# Patient Record
Sex: MALE | Race: WHITE | NOT HISPANIC OR LATINO | Employment: OTHER | URBAN - NONMETROPOLITAN AREA
[De-identification: names, ages, dates, MRNs, and addresses within clinical notes are randomized per-mention and may not be internally consistent; named-entity substitution may affect disease eponyms.]

---

## 2019-05-29 ENCOUNTER — OFFICE VISIT (OUTPATIENT)
Dept: CARDIOLOGY | Facility: CLINIC | Age: 59
End: 2019-05-29

## 2019-05-29 VITALS
DIASTOLIC BLOOD PRESSURE: 84 MMHG | HEIGHT: 69 IN | OXYGEN SATURATION: 98 % | WEIGHT: 181 LBS | HEART RATE: 82 BPM | SYSTOLIC BLOOD PRESSURE: 144 MMHG | BODY MASS INDEX: 26.81 KG/M2

## 2019-05-29 DIAGNOSIS — I10 ESSENTIAL HYPERTENSION: ICD-10-CM

## 2019-05-29 DIAGNOSIS — E78.2 MIXED HYPERLIPIDEMIA: ICD-10-CM

## 2019-05-29 DIAGNOSIS — I45.10 INCOMPLETE RIGHT BUNDLE BRANCH BLOCK: ICD-10-CM

## 2019-05-29 DIAGNOSIS — R07.9 CHEST PAIN IN ADULT: Primary | ICD-10-CM

## 2019-05-29 PROCEDURE — 99204 OFFICE O/P NEW MOD 45 MIN: CPT | Performed by: INTERNAL MEDICINE

## 2019-05-29 PROCEDURE — 93000 ELECTROCARDIOGRAM COMPLETE: CPT | Performed by: INTERNAL MEDICINE

## 2019-05-29 RX ORDER — AMLODIPINE BESYLATE 5 MG/1
5 TABLET ORAL DAILY
Qty: 30 TABLET | Refills: 0 | Status: SHIPPED | OUTPATIENT
Start: 2019-05-29 | End: 2019-06-29 | Stop reason: SDUPTHER

## 2019-05-29 RX ORDER — LISINOPRIL AND HYDROCHLOROTHIAZIDE 20; 12.5 MG/1; MG/1
TABLET ORAL DAILY
COMMUNITY
Start: 2019-05-09

## 2019-05-29 RX ORDER — ATORVASTATIN CALCIUM 20 MG/1
20 TABLET, FILM COATED ORAL DAILY
Qty: 30 TABLET | Refills: 0 | Status: SHIPPED | OUTPATIENT
Start: 2019-05-29 | End: 2019-06-29 | Stop reason: SDUPTHER

## 2019-05-29 NOTE — PROGRESS NOTES
Subjective   Chief Complaint   Patient presents with   • Abnormal ECG      Blockage per PCP patient states       History of Present Illness  Patient is 58 years old white male who is here for cardiac evaluation because of abnormal EKG and hypertension.  Patient states that he was taking lisinopril HCT 20/12.5 daily along with some beta-blocker which had to be stopped for slow heart rate.  His blood pressure has been running mildly elevated.  He complained of mild chest discomfort.  An EKG apparently showed incomplete right bundle branch block patient also has significant hyperlipidemia and is here for cardiac evaluation.    Chest pain is mild around 4-5 out of 10 in the midsternal area.  No radiation of the pain no accompanying symptoms such as nausea or vomiting.  Pain is not related to exertion.  Blood pressure is a chronic problem and has been difficult to control.  Patient has not taken any medications for hyperlipidemia but he is trying to diet.  He is non-smoker.    Past Surgical History:   Procedure Laterality Date   • ARM WOUND REPAIR / CLOSURE       Family History   Problem Relation Age of Onset   • Diabetes Mother    • Heart failure Mother    • Colon cancer Father      Past Medical History:   Diagnosis Date   • Hypertension        Patient Active Problem List   Diagnosis   • Chest pain in adult   • Essential hypertension   • Mixed hyperlipidemia   • Incomplete right bundle branch block         Social History     Tobacco Use   • Smoking status: Never Smoker   • Smokeless tobacco: Never Used   Substance Use Topics   • Alcohol use: No     Frequency: Never   • Drug use: No         The following portions of the patient's history were reviewed and updated as appropriate: allergies, current medications, past family history, past medical history, past social history, past surgical history and problem list.    No Known Allergies      Current Outpatient Medications:   •  lisinopril-hydrochlorothiazide  "(PRINZIDE,ZESTORETIC) 20-12.5 MG per tablet, Daily., Disp: , Rfl:   •  amLODIPine (NORVASC) 5 MG tablet, Take 1 tablet by mouth Daily., Disp: 30 tablet, Rfl: 0  •  atorvastatin (LIPITOR) 20 MG tablet, Take 1 tablet by mouth Daily., Disp: 30 tablet, Rfl: 0    Review of Systems   Constitution: Negative.   HENT: Negative.  Negative for congestion.    Eyes: Negative.    Cardiovascular: Positive for chest pain and dyspnea on exertion. Negative for cyanosis, irregular heartbeat, leg swelling, near-syncope, orthopnea, palpitations, paroxysmal nocturnal dyspnea and syncope.   Respiratory: Positive for shortness of breath.    Endocrine: Negative.    Hematologic/Lymphatic: Negative.    Skin: Negative.    Musculoskeletal: Negative.    Gastrointestinal: Negative.    Genitourinary: Negative.    Neurological: Negative.  Negative for headaches.   Psychiatric/Behavioral: Negative.         Objective      /84   Pulse 82   Ht 175.3 cm (69\")   Wt 82.1 kg (181 lb)   SpO2 98%   BMI 26.73 kg/m²     Physical Exam   Constitutional: He appears well-developed and well-nourished. No distress.   HENT:   Head: Normocephalic and atraumatic.   Mouth/Throat: Oropharynx is clear and moist. No oropharyngeal exudate.   Eyes: Conjunctivae and EOM are normal. Pupils are equal, round, and reactive to light. No scleral icterus.   Neck: Normal range of motion. Neck supple. No JVD present. No tracheal deviation present. No thyromegaly present.   Cardiovascular: Normal rate, regular rhythm, normal heart sounds and intact distal pulses. PMI is not displaced. Exam reveals no gallop, no friction rub and no decreased pulses.   No murmur heard.  Pulses:       Carotid pulses are 3+ on the right side, and 3+ on the left side.       Radial pulses are 3+ on the right side, and 3+ on the left side.   Pulmonary/Chest: Effort normal and breath sounds normal. No respiratory distress. He has no wheezes. He has no rales. He exhibits no tenderness.   Abdominal: " Soft. Bowel sounds are normal. He exhibits no distension, no abdominal bruit and no mass. There is no splenomegaly or hepatomegaly. There is no tenderness. There is no rebound and no guarding.   Musculoskeletal: Normal range of motion. He exhibits no edema, tenderness or deformity.   Lymphadenopathy:     He has no cervical adenopathy.   Neurological: He is alert. He has normal reflexes. No cranial nerve deficit. He exhibits normal muscle tone. Coordination normal.   Skin: Skin is warm and dry. No rash noted. He is not diaphoretic. No erythema.   Psychiatric: He has a normal mood and affect. His behavior is normal. Judgment and thought content normal.       Lab Review:          ECG 12 Lead  Date/Time: 5/29/2019 3:50 PM  Performed by: Cristhian Ruiz MD  Authorized by: Cristhian Ruiz MD   Previous ECG: no previous ECG available  Rhythm: sinus rhythm  Rate: normal  BPM: 62  Conduction: incomplete right bundle branch block  ST Segments: ST segments normal  T Waves: T waves normal  QRS axis: normal  Other: no other findings    Clinical impression: non-specific ECG            I reviewed the patient's new clinical results.  I personally viewed and interpreted the patient's EKG/lab data        Assessment:   Diagnosis Plan   1. Chest pain in adult  Adult Transthoracic Echo Complete W/ Cont if Necessary Per Protocol    Stress Test With Myocardial Perfusion One Day    ECG 12 Lead   2. Mixed hyperlipidemia  Comprehensive Metabolic Panel    CK    Lipid Panel   3. Essential hypertension     4. Incomplete right bundle branch block            Plan:  Patient complains of chest pain with typical and atypical features.  It is probably noncardiac however further cardiac work-up is indicated.  Echo and stress test was scheduled.    Blood pressure is uncontrolled  Patient was advised to continue Zestoretic 20/12.5 daily.  Amlodipine 5 mg daily was added.  Patient also has significant hyperlipidemia  He was advised to  take Lipitor 20 mg daily.  Lab work will need to be checked in 1 month.  Patient will have his lab work checked with his PCP.    Healthy lifestyle and aggressive risk factor modification emphasized.  Follow-up after the stress test.    Thank you for giving me the oppertunity to participate in your patient's cardiac care.    Sincerely,    ADARSH Ruiz M.D. FACP [facility]     No Follow-up on file.

## 2019-06-06 ENCOUNTER — HOSPITAL ENCOUNTER (OUTPATIENT)
Dept: NUCLEAR MEDICINE | Facility: HOSPITAL | Age: 59
Discharge: HOME | End: 2019-06-06

## 2019-06-06 ENCOUNTER — HOSPITAL ENCOUNTER (OUTPATIENT)
Dept: CARDIOLOGY | Facility: HOSPITAL | Age: 59
Discharge: HOME | End: 2019-06-06

## 2019-06-06 DIAGNOSIS — R07.9 CHEST PAIN IN ADULT: ICD-10-CM

## 2019-06-06 PROCEDURE — 78452 HT MUSCLE IMAGE SPECT MULT: CPT | Performed by: INTERNAL MEDICINE

## 2019-06-06 PROCEDURE — 25010000002 REGADENOSON 0.4 MG/5ML SOLUTION: Performed by: INTERNAL MEDICINE

## 2019-06-06 PROCEDURE — 93018 CV STRESS TEST I&R ONLY: CPT | Performed by: INTERNAL MEDICINE

## 2019-06-06 PROCEDURE — 78452 HT MUSCLE IMAGE SPECT MULT: CPT

## 2019-06-06 PROCEDURE — 0 TECHNETIUM SESTAMIBI: Performed by: INTERNAL MEDICINE

## 2019-06-06 PROCEDURE — 93306 TTE W/DOPPLER COMPLETE: CPT

## 2019-06-06 PROCEDURE — 93306 TTE W/DOPPLER COMPLETE: CPT | Performed by: INTERNAL MEDICINE

## 2019-06-06 PROCEDURE — A9500 TC99M SESTAMIBI: HCPCS | Performed by: INTERNAL MEDICINE

## 2019-06-06 PROCEDURE — 93017 CV STRESS TEST TRACING ONLY: CPT

## 2019-06-06 RX ADMIN — TECHNETIUM TC 99M SESTAMIBI 1 DOSE: 1 INJECTION INTRAVENOUS at 10:03

## 2019-06-06 RX ADMIN — REGADENOSON 0.4 MG: 0.08 INJECTION, SOLUTION INTRAVENOUS at 10:03

## 2019-06-06 RX ADMIN — TECHNETIUM TC 99M SESTAMIBI 1 DOSE: 1 INJECTION INTRAVENOUS at 08:25

## 2019-06-07 ENCOUNTER — TELEPHONE (OUTPATIENT)
Dept: CARDIOLOGY | Facility: CLINIC | Age: 59
End: 2019-06-07

## 2019-06-07 LAB
BH CV ECHO MEAS - % IVS THICK: 14.2 %
BH CV ECHO MEAS - % LVPW THICK: -4.9 %
BH CV ECHO MEAS - ACS: 2 CM
BH CV ECHO MEAS - AO MAX PG: 12.8 MMHG
BH CV ECHO MEAS - AO MEAN PG: 5 MMHG
BH CV ECHO MEAS - AO ROOT AREA (BSA CORRECTED): 1.7
BH CV ECHO MEAS - AO ROOT AREA: 8.8 CM^2
BH CV ECHO MEAS - AO ROOT DIAM: 3.4 CM
BH CV ECHO MEAS - AO V2 MAX: 179 CM/SEC
BH CV ECHO MEAS - AO V2 MEAN: 99.3 CM/SEC
BH CV ECHO MEAS - AO V2 VTI: 41.9 CM
BH CV ECHO MEAS - BSA(HAYCOCK): 2 M^2
BH CV ECHO MEAS - BSA: 2 M^2
BH CV ECHO MEAS - BZI_BMI: 26.7 KILOGRAMS/M^2
BH CV ECHO MEAS - BZI_METRIC_HEIGHT: 175.3 CM
BH CV ECHO MEAS - BZI_METRIC_WEIGHT: 82.1 KG
BH CV ECHO MEAS - EDV(CUBED): 137 ML
BH CV ECHO MEAS - EDV(MOD-SP4): 76 ML
BH CV ECHO MEAS - EDV(TEICH): 126.9 ML
BH CV ECHO MEAS - EF(CUBED): 75.2 %
BH CV ECHO MEAS - EF(MOD-SP4): 67.1 %
BH CV ECHO MEAS - EF(TEICH): 66.7 %
BH CV ECHO MEAS - ESV(CUBED): 34 ML
BH CV ECHO MEAS - ESV(MOD-SP4): 25 ML
BH CV ECHO MEAS - ESV(TEICH): 42.2 ML
BH CV ECHO MEAS - FS: 37.1 %
BH CV ECHO MEAS - IVS/LVPW: 0.81
BH CV ECHO MEAS - IVSD: 1.1 CM
BH CV ECHO MEAS - IVSS: 1.2 CM
BH CV ECHO MEAS - LA DIMENSION: 3.5 CM
BH CV ECHO MEAS - LA/AO: 1
BH CV ECHO MEAS - LV DIASTOLIC VOL/BSA (35-75): 38.4 ML/M^2
BH CV ECHO MEAS - LV MASS(C)D: 241.9 GRAMS
BH CV ECHO MEAS - LV MASS(C)DI: 122.2 GRAMS/M^2
BH CV ECHO MEAS - LV MASS(C)S: 126.4 GRAMS
BH CV ECHO MEAS - LV MASS(C)SI: 63.8 GRAMS/M^2
BH CV ECHO MEAS - LV SYSTOLIC VOL/BSA (12-30): 12.6 ML/M^2
BH CV ECHO MEAS - LVIDD: 5.2 CM
BH CV ECHO MEAS - LVIDS: 3.2 CM
BH CV ECHO MEAS - LVLD AP4: 6.8 CM
BH CV ECHO MEAS - LVLS AP4: 5.9 CM
BH CV ECHO MEAS - LVOT AREA (M): 3.5 CM^2
BH CV ECHO MEAS - LVOT AREA: 3.5 CM^2
BH CV ECHO MEAS - LVOT DIAM: 2.1 CM
BH CV ECHO MEAS - LVPWD: 1.3 CM
BH CV ECHO MEAS - LVPWS: 1.3 CM
BH CV ECHO MEAS - MV A MAX VEL: 65.2 CM/SEC
BH CV ECHO MEAS - MV E MAX VEL: 83.9 CM/SEC
BH CV ECHO MEAS - MV E/A: 1.3
BH CV ECHO MEAS - PA ACC SLOPE: 1700 CM/SEC^2
BH CV ECHO MEAS - PA ACC TIME: 0.08 SEC
BH CV ECHO MEAS - PA PR(ACCEL): 44.4 MMHG
BH CV ECHO MEAS - RAP SYSTOLE: 10 MMHG
BH CV ECHO MEAS - RVSP: 44.1 MMHG
BH CV ECHO MEAS - SI(AO): 186.5 ML/M^2
BH CV ECHO MEAS - SI(CUBED): 52 ML/M^2
BH CV ECHO MEAS - SI(MOD-SP4): 25.8 ML/M^2
BH CV ECHO MEAS - SI(TEICH): 42.8 ML/M^2
BH CV ECHO MEAS - SV(AO): 369.3 ML
BH CV ECHO MEAS - SV(CUBED): 103 ML
BH CV ECHO MEAS - SV(MOD-SP4): 51 ML
BH CV ECHO MEAS - SV(TEICH): 84.7 ML
BH CV ECHO MEAS - TR MAX VEL: 292 CM/SEC
BH CV NUCLEAR PRIOR STUDY: 3
BH CV STRESS BP STAGE 1: NORMAL
BH CV STRESS BP STAGE 2: NORMAL
BH CV STRESS COMMENTS STAGE 1: NORMAL
BH CV STRESS COMMENTS STAGE 2: NORMAL
BH CV STRESS DOSE REGADENOSON STAGE 1: 0.4
BH CV STRESS DURATION MIN STAGE 1: 0
BH CV STRESS DURATION MIN STAGE 2: 4
BH CV STRESS DURATION SEC STAGE 1: 10
BH CV STRESS DURATION SEC STAGE 2: 0
BH CV STRESS HR STAGE 1: 89
BH CV STRESS HR STAGE 2: 61
BH CV STRESS PROTOCOL 1: NORMAL
BH CV STRESS RECOVERY BP: NORMAL MMHG
BH CV STRESS RECOVERY HR: 61 BPM
BH CV STRESS STAGE 1: 1
BH CV STRESS STAGE 2: 2
LV EF NUC BP: 76 %
MAXIMAL PREDICTED HEART RATE: 161 BPM
PERCENT MAX PREDICTED HR: 55.28 %
STRESS BASELINE BP: NORMAL MMHG
STRESS BASELINE HR: 54 BPM
STRESS PERCENT HR: 65 %
STRESS POST PEAK BP: NORMAL MMHG
STRESS POST PEAK HR: 89 BPM
STRESS TARGET HR: 137 BPM

## 2019-06-07 NOTE — TELEPHONE ENCOUNTER
----- Message from Cristhian Ruiz MD sent at 6/7/2019 10:56 AM EDT -----  Echo and stress tests are okay.  There does not appear to be any blockages.  Keep your appointment will discuss further

## 2019-06-26 ENCOUNTER — TELEPHONE (OUTPATIENT)
Dept: CARDIOLOGY | Facility: CLINIC | Age: 59
End: 2019-06-26

## 2019-06-26 ENCOUNTER — OFFICE VISIT (OUTPATIENT)
Dept: CARDIOLOGY | Facility: CLINIC | Age: 59
End: 2019-06-26

## 2019-06-26 VITALS
HEIGHT: 69 IN | OXYGEN SATURATION: 97 % | SYSTOLIC BLOOD PRESSURE: 140 MMHG | WEIGHT: 182 LBS | HEART RATE: 69 BPM | BODY MASS INDEX: 26.96 KG/M2 | DIASTOLIC BLOOD PRESSURE: 76 MMHG

## 2019-06-26 DIAGNOSIS — I10 ESSENTIAL HYPERTENSION: ICD-10-CM

## 2019-06-26 DIAGNOSIS — I45.10 INCOMPLETE RIGHT BUNDLE BRANCH BLOCK: ICD-10-CM

## 2019-06-26 DIAGNOSIS — E78.2 MIXED HYPERLIPIDEMIA: Primary | ICD-10-CM

## 2019-06-26 DIAGNOSIS — R07.89 ATYPICAL CHEST PAIN: ICD-10-CM

## 2019-06-26 PROCEDURE — 99213 OFFICE O/P EST LOW 20 MIN: CPT | Performed by: INTERNAL MEDICINE

## 2019-06-26 NOTE — TELEPHONE ENCOUNTER
----- Message from Cristhian Ruiz MD sent at 6/26/2019  2:10 PM EDT -----  Thyroid level is low.  Check with your PCP   may need thyroid medications  Cholesterol is okay

## 2019-06-26 NOTE — PROGRESS NOTES
subjective     Chief Complaint   Patient presents with   • Results     Stress and Echo     History of Present Illness  Patient is 58 years old white male who was seen by me because of chest pain.  Patient has multiple risk factors for coronary disease.  Blood pressure is very well controlled with Norvasc and lisinopril HCT  He is taking Lipitor for hyperlipidemia.  Patient underwent a stress test and echocardiogram is here for follow-up.  He has been doing very well at this time    Past Surgical History:   Procedure Laterality Date   • ARM WOUND REPAIR / CLOSURE       Family History   Problem Relation Age of Onset   • Diabetes Mother    • Heart failure Mother    • Colon cancer Father      Past Medical History:   Diagnosis Date   • Hypertension      Patient Active Problem List   Diagnosis   • Atypical chest pain   • Essential hypertension   • Mixed hyperlipidemia   • Incomplete right bundle branch block       Social History     Tobacco Use   • Smoking status: Never Smoker   • Smokeless tobacco: Never Used   Substance Use Topics   • Alcohol use: No     Frequency: Never   • Drug use: No       No Known Allergies    Current Outpatient Medications on File Prior to Visit   Medication Sig   • amLODIPine (NORVASC) 5 MG tablet Take 1 tablet by mouth Daily.   • atorvastatin (LIPITOR) 20 MG tablet Take 1 tablet by mouth Daily.   • lisinopril-hydrochlorothiazide (PRINZIDE,ZESTORETIC) 20-12.5 MG per tablet Daily.     No current facility-administered medications on file prior to visit.          The following portions of the patient's history were reviewed and updated as appropriate: allergies, current medications, past family history, past medical history, past social history, past surgical history and problem list.    Review of Systems   Constitution: Negative.   HENT: Negative.  Negative for congestion.    Eyes: Negative.    Cardiovascular: Negative.  Negative for chest pain, cyanosis, dyspnea on exertion, irregular heartbeat,  "leg swelling, near-syncope, orthopnea, palpitations, paroxysmal nocturnal dyspnea and syncope.   Respiratory: Negative.  Negative for shortness of breath.    Hematologic/Lymphatic: Negative.    Musculoskeletal: Negative.    Gastrointestinal: Negative.    Neurological: Negative.  Negative for headaches.          Objective:     /76 (BP Location: Left arm, Patient Position: Sitting)   Pulse 69   Ht 175.3 cm (69\")   Wt 82.6 kg (182 lb)   SpO2 97%   BMI 26.88 kg/m²   Physical Exam   Constitutional: He appears well-developed and well-nourished. No distress.   HENT:   Head: Normocephalic and atraumatic.   Mouth/Throat: Oropharynx is clear and moist. No oropharyngeal exudate.   Eyes: Conjunctivae and EOM are normal. Pupils are equal, round, and reactive to light. No scleral icterus.   Neck: Normal range of motion. Neck supple. No JVD present. No tracheal deviation present. No thyromegaly present.   Cardiovascular: Normal rate, regular rhythm, normal heart sounds and intact distal pulses. PMI is not displaced. Exam reveals no gallop, no friction rub and no decreased pulses.   No murmur heard.  Pulses:       Carotid pulses are 3+ on the right side, and 3+ on the left side.       Radial pulses are 3+ on the right side, and 3+ on the left side.   Pulmonary/Chest: Effort normal and breath sounds normal. No respiratory distress. He has no wheezes. He has no rales. He exhibits no tenderness.   Abdominal: Soft. Bowel sounds are normal. He exhibits no distension, no abdominal bruit and no mass. There is no splenomegaly or hepatomegaly. There is no tenderness. There is no rebound and no guarding.   Musculoskeletal: Normal range of motion. He exhibits no edema, tenderness or deformity.   Lymphadenopathy:     He has no cervical adenopathy.   Neurological: He is alert. He has normal reflexes. No cranial nerve deficit. He exhibits normal muscle tone. Coordination normal.   Skin: Skin is warm and dry. No rash noted. He is not " diaphoretic. No erythema.   Psychiatric: He has a normal mood and affect. His behavior is normal. Judgment and thought content normal.         Lab Review    Procedures  Interpretation Summary     · Normal left ventricular cavity size and wall thickness noted.  · Left ventricular systolic function is normal. Estimated EF appears to be in the range of 61 - 65%.  · Left ventricular diastolic function is normal.  · Mild mitral valve regurgitation is present.  · Mild tricuspid valve regurgitation is present. Mild pulmonary hypertension is present (35-45 mmHg).  · There is no evidence of pericardial effusion.     · A pharmacological stress test was performed using regadenoson with low-level exercise.  · Baseline ECG rhythm of sinus bradycardia noted otherwise normal EKG.  · Findings consistent with a normal ECG stress test.  · Left ventricular ejection fraction is hyperdynamic (Calculated EF > 70%).  · Myocardial perfusion imaging indicates a normal myocardial perfusion study with no evidence of ischemia.  · Impressions are consistent with a low risk study.     I personally viewed and interpreted the patient's LAB data         Assessment:     1. Mixed hyperlipidemia    2. Essential hypertension    3. Incomplete right bundle branch block    4. Atypical chest pain          Plan:     Results of echocardiogram and stress test were discussed with patient  LV ejection fraction is normal there is no evidence of exercise-induced myocardial ischemia  Mild mitral and tricuspid dilatation was noted which is medically significant  Healthy lifestyle and aggressive cytometric emphasized  I feel that chest pain is noncardiac        No Follow-up on file.

## 2019-06-27 PROBLEM — R07.89 ATYPICAL CHEST PAIN: Status: ACTIVE | Noted: 2019-05-29

## 2019-07-01 RX ORDER — AMLODIPINE BESYLATE 5 MG/1
TABLET ORAL
Qty: 30 TABLET | Refills: 0 | Status: SHIPPED | OUTPATIENT
Start: 2019-07-01

## 2019-07-01 RX ORDER — ATORVASTATIN CALCIUM 20 MG/1
TABLET, FILM COATED ORAL
Qty: 30 TABLET | Refills: 0 | Status: SHIPPED | OUTPATIENT
Start: 2019-07-01

## 2023-09-11 ENCOUNTER — LAB (OUTPATIENT)
Dept: LAB | Facility: HOSPITAL | Age: 63
End: 2023-09-11
Payer: MEDICARE

## 2023-09-11 ENCOUNTER — OFFICE VISIT (OUTPATIENT)
Dept: CARDIOLOGY | Facility: CLINIC | Age: 63
End: 2023-09-11
Payer: MEDICARE

## 2023-09-11 VITALS
SYSTOLIC BLOOD PRESSURE: 144 MMHG | OXYGEN SATURATION: 98 % | BODY MASS INDEX: 24.6 KG/M2 | WEIGHT: 171.8 LBS | DIASTOLIC BLOOD PRESSURE: 71 MMHG | HEIGHT: 70 IN | HEART RATE: 50 BPM

## 2023-09-11 DIAGNOSIS — I10 ESSENTIAL HYPERTENSION: ICD-10-CM

## 2023-09-11 DIAGNOSIS — R00.1 BRADYCARDIA, SINUS: Primary | ICD-10-CM

## 2023-09-11 DIAGNOSIS — I34.0 NONRHEUMATIC MITRAL VALVE REGURGITATION: ICD-10-CM

## 2023-09-11 DIAGNOSIS — E78.2 MIXED HYPERLIPIDEMIA: ICD-10-CM

## 2023-09-11 DIAGNOSIS — R00.1 BRADYCARDIA, SINUS: ICD-10-CM

## 2023-09-11 PROCEDURE — 93000 ELECTROCARDIOGRAM COMPLETE: CPT | Performed by: INTERNAL MEDICINE

## 2023-09-11 PROCEDURE — 80061 LIPID PANEL: CPT

## 2023-09-11 PROCEDURE — 84443 ASSAY THYROID STIM HORMONE: CPT

## 2023-09-11 PROCEDURE — 99204 OFFICE O/P NEW MOD 45 MIN: CPT | Performed by: INTERNAL MEDICINE

## 2023-09-11 PROCEDURE — 85025 COMPLETE CBC W/AUTO DIFF WBC: CPT

## 2023-09-11 PROCEDURE — 84439 ASSAY OF FREE THYROXINE: CPT

## 2023-09-11 PROCEDURE — 3078F DIAST BP <80 MM HG: CPT | Performed by: INTERNAL MEDICINE

## 2023-09-11 PROCEDURE — 3077F SYST BP >= 140 MM HG: CPT | Performed by: INTERNAL MEDICINE

## 2023-09-11 PROCEDURE — 36415 COLL VENOUS BLD VENIPUNCTURE: CPT

## 2023-09-11 PROCEDURE — 80053 COMPREHEN METABOLIC PANEL: CPT

## 2023-09-11 NOTE — LETTER
September 11, 2023     PATRICIA Howard  [address]  [facility] [address]    Patient: Sarabjit Haji   YOB: 1960   Date of Visit: 9/11/2023       Dear PATRICIA Howard,    Sarabjit Haji was in my office today. Below are the relevant portions of my assessment and plan of care.           If you have questions, please do not hesitate to call me. I look forward to following Sarabjit along with you.         Sincerely,        Cristhian Ruiz MD        CC: No Recipients

## 2023-09-11 NOTE — PROGRESS NOTES
Subjective   Chief Complaint   Patient presents with    Establish Care     Pt states heart rate drops into 30's while sleeping        History of Present Illness    Patient is 62 years old white male who is here for cardiac evaluation because of slow heart rate.  His heart generally runs in the low 50s.  He feels like he might pass out.  There has been no syncopal episode.  During sleep his heart goes down to 30 according to him.    He is always weak tired and fatigued.  He denies any chest tightness or pressure sensation.    He has history of hypothyroidism but he is not taking any thyroid replacement therapy.  There is no prior history of coronary artery disease.  He has history of hyperlipidemia and is taking Lipitor 40 mg daily.    Hypertension is well controlled with lisinopril 20/12.5 mg daily.    Past Surgical History:   Procedure Laterality Date    ARM WOUND REPAIR / CLOSURE       Family History   Problem Relation Age of Onset    Diabetes Mother     Heart failure Mother     Colon cancer Father      Past Medical History:   Diagnosis Date    Hypertension        Patient Active Problem List   Diagnosis    Atypical chest pain    Essential hypertension    Mixed hyperlipidemia    Incomplete right bundle branch block    Bradycardia, sinus    Mild mitral and tricuspid regurgitation with mild pulmonary hypertension.         Social History     Tobacco Use    Smoking status: Never    Smokeless tobacco: Never   Substance Use Topics    Alcohol use: No    Drug use: No         The following portions of the patient's history were reviewed and updated as appropriate: allergies, current medications, past family history, past medical history, past social history, past surgical history and problem list.    Allergies   Allergen Reactions    Aspirin Nausea And Vomiting         Current Outpatient Medications:     atorvastatin (LIPITOR) 20 MG tablet, TAKE 1 TABLET BY MOUTH ONCE DAILY (Patient taking differently: Take 2 tablets by  "mouth Daily.), Disp: 30 tablet, Rfl: 0    lisinopril-hydrochlorothiazide (PRINZIDE,ZESTORETIC) 20-12.5 MG per tablet, Daily., Disp: , Rfl:     amLODIPine (NORVASC) 5 MG tablet, TAKE 1 TABLET BY MOUTH ONCE DAILY, Disp: 30 tablet, Rfl: 0    Review of Systems   Constitutional: Positive for malaise/fatigue.   HENT: Negative.  Negative for congestion.    Eyes: Negative.    Cardiovascular: Negative.  Negative for chest pain, cyanosis, dyspnea on exertion, irregular heartbeat, leg swelling, near-syncope, orthopnea, palpitations, paroxysmal nocturnal dyspnea and syncope.        Slow heart rate   Respiratory: Negative.  Negative for shortness of breath.    Hematologic/Lymphatic: Negative.    Musculoskeletal: Negative.    Gastrointestinal: Negative.    Neurological: Negative.  Negative for headaches.      Objective      /71 (BP Location: Right arm, Patient Position: Sitting, Cuff Size: Adult)   Pulse 50   Ht 177.8 cm (70\")   Wt 77.9 kg (171 lb 12.8 oz)   SpO2 98%   BMI 24.65 kg/m²     Pulmonary:      Effort: Pulmonary effort is normal.      Breath sounds: Normal breath sounds. No stridor. No wheezing. No rhonchi. No rales.   Cardiovascular:      PMI at left midclavicular line. Normal rate. Regular rhythm. Normal S1. Normal S2.       Murmurs: There is no murmur.      No gallop.  No click. No rub.   Pulses:     Intact distal pulses.   Edema:     Peripheral edema absent.       Lab Review:      Lab work scheduled.  Patient will have CBC CMP lipid panel, T4 and TSH.    ECG 12 Lead    Date/Time: 9/11/2023 1:36 PM  Performed by: Cristhian Ruiz MD  Authorized by: Cristhian Ruiz MD   Comparison: compared with previous ECG from 5/29/2019  Comparison to previous ECG: Bradycardia is new  Rhythm: sinus bradycardia  Rate: bradycardic  BPM: 43  Conduction: non-specific intraventricular conduction delay  QRS axis: normal  Other findings: left ventricular hypertrophy    Clinical impression: abnormal EKG        I " reviewed the patient's new clinical results.  I personally viewed and interpreted the patient's EKG/lab data        Assessment:   Diagnosis Plan   1. Bradycardia, sinus  T4, Free    TSH    Holter Monitor - 72 Hour Up To 15 Days    ECG 12 Lead      2. Mixed hyperlipidemia  Lipid Panel      3. Essential hypertension  CBC & Differential    Comprehensive Metabolic Panel      4. Mild mitral and tricuspid regurgitation with mild pulmonary hypertension.               Plan:  Patient presents with bradycardia fatigue and dizziness but no syncope or presyncope.  Heart rate generally is in 50s and at night it goes into 30s.    Because of history of hypothyroidism without being on replacement therapy will check TSH and free T4.    We will also arrange Holter monitor for further evaluation.  Hyperlipidemia on statin therapy we will check lipid panel and liver functions.    Valvular heart disease with mild mitral and tricuspid regurgitation with mild pulmonary hypertension asymptomatic.    EKG does not show any acute changes except for bradycardia.  Old records were reviewed including old echo and stress test 4 years ago.  Follow-up scheduled after Holter monitor and lab work.    Thank you for giving me the oppertunity to participate in your patient's cardiac care.    Sincerely,    ADARSH Ruiz M.D. FACP [facility]     No follow-ups on file.

## 2023-09-11 NOTE — LETTER
September 11, 2023     PATRICIA Howard  [address]  [place] [address]    Patient: Sarabjit Haji   YOB: 1960   Date of Visit: 9/11/2023       Dear PATRICIA Howard    Sarabjit Haji was in my office today. Below is a copy of my note.    If you have questions, please do not hesitate to call me. I look forward to following Sarabjit along with you.         Sincerely,        Cristhian Ruiz MD        CC: No Recipients    Subjective  Chief Complaint   Patient presents with   • Establish Care     Pt states heart rate drops into 30's while sleeping        History of Present Illness      Past Surgical History:   Procedure Laterality Date   • ARM WOUND REPAIR / CLOSURE       Family History   Problem Relation Age of Onset   • Diabetes Mother    • Heart failure Mother    • Colon cancer Father      Past Medical History:   Diagnosis Date   • Hypertension        Patient Active Problem List   Diagnosis   • Atypical chest pain   • Essential hypertension   • Mixed hyperlipidemia   • Incomplete right bundle branch block         Social History     Tobacco Use   • Smoking status: Never   • Smokeless tobacco: Never   Substance Use Topics   • Alcohol use: No   • Drug use: No         The following portions of the patient's history were reviewed and updated as appropriate: allergies, current medications, past family history, past medical history, past social history, past surgical history and problem list.    Allergies   Allergen Reactions   • Aspirin Nausea And Vomiting         Current Outpatient Medications:   •  atorvastatin (LIPITOR) 20 MG tablet, TAKE 1 TABLET BY MOUTH ONCE DAILY (Patient taking differently: Take 2 tablets by mouth Daily.), Disp: 30 tablet, Rfl: 0  •  lisinopril-hydrochlorothiazide (PRINZIDE,ZESTORETIC) 20-12.5 MG per tablet, Daily., Disp: , Rfl:   •  amLODIPine (NORVASC) 5 MG tablet, TAKE 1 TABLET BY MOUTH ONCE DAILY, Disp: 30 tablet, Rfl: 0    ROS     Objective     /71 (BP  "Location: Right arm, Patient Position: Sitting, Cuff Size: Adult)   Pulse 50   Ht 177.8 cm (70\")   Wt 77.9 kg (171 lb 12.8 oz)   SpO2 98%   BMI 24.65 kg/m²     Physical Exam    Lab Review:    No results found for: NA, K, CL, BUN, CREATININE, LABGLOM, GLU, GLUCOSE, CALCIUM, ALT, ALKPHOS, LABIL2  No results found for: CKTOTAL  No results found for: WBC, HGB, HCT, PLT  No results found for: INR  No results found for: MG  No results found for: PSA, CHOL, CHLPL, TRIG, HDL, VLDL, LDLHDL  No results found for: BNP      ECG 12 Lead    Date/Time: 9/11/2023 1:36 PM  Performed by: Cristhian Ruiz MD  Authorized by: Cristhian Ruiz MD   Comparison: compared with previous ECG from 5/29/2019  Comparison to previous ECG: Bradycardia is new  Rhythm: sinus bradycardia  Rate: bradycardic  BPM: 43  Conduction: non-specific intraventricular conduction delay  QRS axis: normal  Other findings: left ventricular hypertrophy    Clinical impression: abnormal EKG        I reviewed the patient's new clinical results.  I personally viewed and interpreted the patient's EKG/lab data        Assessment:  No diagnosis found.       Plan:  No diagnosis found.      Thank you for giving me the oppertunity to participate in your patient's cardiac care.    Sincerely,    ADARSH Ruiz M.D. [facility]     No follow-ups on file.     "

## 2023-09-12 LAB
ALBUMIN SERPL-MCNC: 4.4 G/DL (ref 3.5–5.2)
ALBUMIN/GLOB SERPL: 1.5 G/DL
ALP SERPL-CCNC: 91 U/L (ref 39–117)
ALT SERPL W P-5'-P-CCNC: 18 U/L (ref 1–41)
ANION GAP SERPL CALCULATED.3IONS-SCNC: 11 MMOL/L (ref 5–15)
AST SERPL-CCNC: 17 U/L (ref 1–40)
BASOPHILS # BLD AUTO: 0.04 10*3/MM3 (ref 0–0.2)
BASOPHILS NFR BLD AUTO: 0.6 % (ref 0–1.5)
BILIRUB SERPL-MCNC: 0.4 MG/DL (ref 0–1.2)
BUN SERPL-MCNC: 23 MG/DL (ref 8–23)
BUN/CREAT SERPL: 22.1 (ref 7–25)
CALCIUM SPEC-SCNC: 9.6 MG/DL (ref 8.6–10.5)
CHLORIDE SERPL-SCNC: 106 MMOL/L (ref 98–107)
CHOLEST SERPL-MCNC: 111 MG/DL (ref 0–200)
CO2 SERPL-SCNC: 25 MMOL/L (ref 22–29)
CREAT SERPL-MCNC: 1.04 MG/DL (ref 0.76–1.27)
DEPRECATED RDW RBC AUTO: 43.3 FL (ref 37–54)
EGFRCR SERPLBLD CKD-EPI 2021: 80.7 ML/MIN/1.73
EOSINOPHIL # BLD AUTO: 0.16 10*3/MM3 (ref 0–0.4)
EOSINOPHIL NFR BLD AUTO: 2.3 % (ref 0.3–6.2)
ERYTHROCYTE [DISTWIDTH] IN BLOOD BY AUTOMATED COUNT: 13.2 % (ref 12.3–15.4)
GLOBULIN UR ELPH-MCNC: 3 GM/DL
GLUCOSE SERPL-MCNC: 110 MG/DL (ref 65–99)
HCT VFR BLD AUTO: 41.3 % (ref 37.5–51)
HDLC SERPL-MCNC: 39 MG/DL (ref 40–60)
HGB BLD-MCNC: 13.8 G/DL (ref 13–17.7)
IMM GRANULOCYTES # BLD AUTO: 0.02 10*3/MM3 (ref 0–0.05)
IMM GRANULOCYTES NFR BLD AUTO: 0.3 % (ref 0–0.5)
LDLC SERPL CALC-MCNC: 55 MG/DL (ref 0–100)
LDLC/HDLC SERPL: 1.42 {RATIO}
LYMPHOCYTES # BLD AUTO: 1.79 10*3/MM3 (ref 0.7–3.1)
LYMPHOCYTES NFR BLD AUTO: 26.1 % (ref 19.6–45.3)
MCH RBC QN AUTO: 29.9 PG (ref 26.6–33)
MCHC RBC AUTO-ENTMCNC: 33.4 G/DL (ref 31.5–35.7)
MCV RBC AUTO: 89.4 FL (ref 79–97)
MONOCYTES # BLD AUTO: 0.49 10*3/MM3 (ref 0.1–0.9)
MONOCYTES NFR BLD AUTO: 7.1 % (ref 5–12)
NEUTROPHILS NFR BLD AUTO: 4.36 10*3/MM3 (ref 1.7–7)
NEUTROPHILS NFR BLD AUTO: 63.6 % (ref 42.7–76)
NRBC BLD AUTO-RTO: 0 /100 WBC (ref 0–0.2)
PLATELET # BLD AUTO: 228 10*3/MM3 (ref 140–450)
PMV BLD AUTO: 11.1 FL (ref 6–12)
POTASSIUM SERPL-SCNC: 4.2 MMOL/L (ref 3.5–5.2)
PROT SERPL-MCNC: 7.4 G/DL (ref 6–8.5)
RBC # BLD AUTO: 4.62 10*6/MM3 (ref 4.14–5.8)
SODIUM SERPL-SCNC: 142 MMOL/L (ref 136–145)
T4 FREE SERPL-MCNC: 0.93 NG/DL (ref 0.93–1.7)
TRIGL SERPL-MCNC: 84 MG/DL (ref 0–150)
TSH SERPL DL<=0.05 MIU/L-ACNC: 3 UIU/ML (ref 0.27–4.2)
VLDLC SERPL-MCNC: 17 MG/DL (ref 5–40)
WBC NRBC COR # BLD: 6.86 10*3/MM3 (ref 3.4–10.8)

## 2023-10-11 ENCOUNTER — OFFICE VISIT (OUTPATIENT)
Dept: CARDIOLOGY | Facility: CLINIC | Age: 63
End: 2023-10-11
Payer: MEDICARE

## 2023-10-11 VITALS
BODY MASS INDEX: 24.62 KG/M2 | HEART RATE: 50 BPM | DIASTOLIC BLOOD PRESSURE: 75 MMHG | HEIGHT: 70 IN | SYSTOLIC BLOOD PRESSURE: 138 MMHG | OXYGEN SATURATION: 98 % | WEIGHT: 172 LBS

## 2023-10-11 DIAGNOSIS — E78.2 MIXED HYPERLIPIDEMIA: ICD-10-CM

## 2023-10-11 DIAGNOSIS — I10 ESSENTIAL HYPERTENSION: ICD-10-CM

## 2023-10-11 DIAGNOSIS — I45.10 INCOMPLETE RIGHT BUNDLE BRANCH BLOCK: ICD-10-CM

## 2023-10-11 DIAGNOSIS — R00.1 BRADYCARDIA, SINUS: Primary | ICD-10-CM

## 2023-10-11 DIAGNOSIS — I47.10 PAROXYSMAL SVT (SUPRAVENTRICULAR TACHYCARDIA): ICD-10-CM

## 2023-10-11 DIAGNOSIS — I34.0 NONRHEUMATIC MITRAL VALVE REGURGITATION: ICD-10-CM

## 2023-10-11 PROCEDURE — 99214 OFFICE O/P EST MOD 30 MIN: CPT | Performed by: INTERNAL MEDICINE

## 2023-10-11 PROCEDURE — 3078F DIAST BP <80 MM HG: CPT | Performed by: INTERNAL MEDICINE

## 2023-10-11 PROCEDURE — 3075F SYST BP GE 130 - 139MM HG: CPT | Performed by: INTERNAL MEDICINE

## 2023-10-11 NOTE — LETTER
October 14, 2023     PATRICIA Howard  [address]  [facility] [address]    Patient: Sarabjit Haji   YOB: 1960   Date of Visit: 10/11/2023       Dear PATRICIA Howard    Sarabjit Haji was in my office today. Below is a copy of my note.    If you have questions, please do not hesitate to call me. I look forward to following Sarabjit along with you.         Sincerely,        Cristhian Ruiz MD        CC: No Recipients    subjective     Chief Complaint   Patient presents with    Results     Holter Monitor    Slow Heart Rate    Fatigue     weak       Problems Addressed This Visit  No diagnosis found.    History of Present Illness          Past Surgical History:   Procedure Laterality Date    ARM WOUND REPAIR / CLOSURE       Family History   Problem Relation Age of Onset    Diabetes Mother     Heart failure Mother     Colon cancer Father      Past Medical History:   Diagnosis Date    Hypertension      Patient Active Problem List   Diagnosis    Atypical chest pain    Essential hypertension    Mixed hyperlipidemia    Incomplete right bundle branch block    Bradycardia, sinus    Mild mitral and tricuspid regurgitation with mild pulmonary hypertension.       Social History     Tobacco Use    Smoking status: Former     Packs/day: 0.50     Years: 3.00     Additional pack years: 0.00     Total pack years: 1.50     Types: Cigarettes    Smokeless tobacco: Never   Substance Use Topics    Alcohol use: No    Drug use: No       Allergies   Allergen Reactions    Aspirin Nausea And Vomiting       Current Outpatient Medications on File Prior to Visit   Medication Sig    amLODIPine (NORVASC) 5 MG tablet TAKE 1 TABLET BY MOUTH ONCE DAILY    atorvastatin (LIPITOR) 20 MG tablet TAKE 1 TABLET BY MOUTH ONCE DAILY (Patient taking differently: Take 2 tablets by mouth Daily.)    lisinopril-hydrochlorothiazide (PRINZIDE,ZESTORETIC) 20-12.5 MG per tablet Daily.     No current  "facility-administered medications on file prior to visit.         The following portions of the patient's history were reviewed and updated as appropriate: allergies, current medications, past family history, past medical history, past social history, past surgical history and problem list.    ROS       Objective:     /75 (BP Location: Left arm, Patient Position: Sitting, Cuff Size: Adult)   Pulse 50   Ht 177.8 cm (70\")   Wt 78 kg (172 lb)   SpO2 98%   BMI 24.68 kg/mý   Physical Exam      Lab Review  Lab Results   Component Value Date     09/11/2023    K 4.2 09/11/2023     09/11/2023    BUN 23 09/11/2023    CREATININE 1.04 09/11/2023    GLUCOSE 110 (H) 09/11/2023    CALCIUM 9.6 09/11/2023    ALT 18 09/11/2023    ALKPHOS 91 09/11/2023     No results found for: \"CKTOTAL\"  Lab Results   Component Value Date    WBC 6.86 09/11/2023    HGB 13.8 09/11/2023    HCT 41.3 09/11/2023     09/11/2023     No results found for: \"INR\"  No results found for: \"MG\"  Lab Results   Component Value Date    TSH 3.000 09/11/2023     No results found for: \"BNP\"  Lab Results   Component Value Date    CHOL 111 09/11/2023    TRIG 84 09/11/2023    HDL 39 (L) 09/11/2023    VLDL 17 09/11/2023    LDL 55 09/11/2023     Lab Results   Component Value Date    LDL 55 09/11/2023     No results found for: \"PROBNP\"            Procedures       I personally viewed and interpreted the patient's LAB data         Assessment:   No diagnosis found.      Plan:              No follow-ups on file.    "

## 2023-10-11 NOTE — PROGRESS NOTES
subjective     Chief Complaint   Patient presents with    Results     Holter Monitor    Slow Heart Rate    Fatigue     weak       Problems Addressed This Visit  1. Bradycardia, sinus    2. Essential hypertension    3. Incomplete right bundle branch block    4. Mixed hyperlipidemia    5. Mild mitral and tricuspid regurgitation with mild pulmonary hypertension.    6. Paroxysmal SVT (supraventricular tachycardia)        History of Present Illness  Patient is 62 years old white male who is here for cardiology follow-up.  He has history of sinus bradycardia and incomplete right bundle branch block.  He had Holter monitor and is here for follow-up we will discuss the results.    He denies any dizziness syncope or presyncope.  Heart rate is around 50 bpm.    Hyperlipidemia  He is taking Lipitor 40 mg daily.  No drug side effects.    Hypertension  He states that he is only taking Norvasc and is not taking lisinopril HCT anymore.    No chest pain palpitations or shortness of breath.        Past Surgical History:   Procedure Laterality Date    ARM WOUND REPAIR / CLOSURE       Family History   Problem Relation Age of Onset    Diabetes Mother     Heart failure Mother     Colon cancer Father      Past Medical History:   Diagnosis Date    Hypertension      Patient Active Problem List   Diagnosis    Atypical chest pain    Essential hypertension    Mixed hyperlipidemia    Incomplete right bundle branch block    Bradycardia, sinus    Mild mitral and tricuspid regurgitation with mild pulmonary hypertension.    Paroxysmal SVT (supraventricular tachycardia)       Social History     Tobacco Use    Smoking status: Former     Packs/day: 0.50     Years: 3.00     Additional pack years: 0.00     Total pack years: 1.50     Types: Cigarettes    Smokeless tobacco: Never   Substance Use Topics    Alcohol use: No    Drug use: No       Allergies   Allergen Reactions    Aspirin Nausea And Vomiting       Current Outpatient Medications on File Prior  "to Visit   Medication Sig    amLODIPine (NORVASC) 5 MG tablet TAKE 1 TABLET BY MOUTH ONCE DAILY    atorvastatin (LIPITOR) 20 MG tablet TAKE 1 TABLET BY MOUTH ONCE DAILY (Patient taking differently: Take 2 tablets by mouth Daily.)    lisinopril-hydrochlorothiazide (PRINZIDE,ZESTORETIC) 20-12.5 MG per tablet Daily.     No current facility-administered medications on file prior to visit.         The following portions of the patient's history were reviewed and updated as appropriate: allergies, current medications, past family history, past medical history, past social history, past surgical history and problem list.    Review of Systems   Constitutional: Negative.   HENT: Negative.  Negative for congestion.    Eyes: Negative.    Cardiovascular: Negative.  Negative for chest pain, cyanosis, dyspnea on exertion, irregular heartbeat, leg swelling, near-syncope, orthopnea, palpitations, paroxysmal nocturnal dyspnea and syncope.   Respiratory: Negative.  Negative for shortness of breath.    Hematologic/Lymphatic: Negative.    Musculoskeletal: Negative.    Gastrointestinal: Negative.    Neurological: Negative.  Negative for headaches.          Objective:     /75 (BP Location: Left arm, Patient Position: Sitting, Cuff Size: Adult)   Pulse 50   Ht 177.8 cm (70\")   Wt 78 kg (172 lb)   SpO2 98%   BMI 24.68 kg/mý   Cardiovascular:      PMI at left midclavicular line. Bradycardia present. Regular rhythm. Normal S1. Normal S2.       Murmurs: There is no murmur.      No gallop.  No click. No rub.   Pulses:     Intact distal pulses.   Edema:     Peripheral edema absent.           Lab Review  Lab Results   Component Value Date     09/11/2023    K 4.2 09/11/2023     09/11/2023    BUN 23 09/11/2023    CREATININE 1.04 09/11/2023    GLUCOSE 110 (H) 09/11/2023    CALCIUM 9.6 09/11/2023    ALT 18 09/11/2023    ALKPHOS 91 09/11/2023     No results found for: \"CKTOTAL\"  Lab Results   Component Value Date    WBC 6.86 " "09/11/2023    HGB 13.8 09/11/2023    HCT 41.3 09/11/2023     09/11/2023     No results found for: \"INR\"  No results found for: \"MG\"  Lab Results   Component Value Date    TSH 3.000 09/11/2023     No results found for: \"BNP\"  Lab Results   Component Value Date    CHOL 111 09/11/2023    TRIG 84 09/11/2023    HDL 39 (L) 09/11/2023    VLDL 17 09/11/2023    LDL 55 09/11/2023     Lab Results   Component Value Date    LDL 55 09/11/2023     No results found for: \"PROBNP\"            Procedures  Interpretation Summary         The patient was monitored for 13 days 22 hours and 46 minutes.    The predominant rhythm noted during the testing period was sinus rhythm.    Average HR: 54. Min HR: 33. Max HR: 139.    Short runs of atrial tachycardia followed by pauses.  Longest pause 2.1 seconds.    Tachybradycardia syndrome.    Frequent PVCs, ventricular bigeminy and 3 beat run of ventricular tachycardia noted.       PAC triplets         SVT followed by pause    \  Min HR                  I personally viewed and interpreted the patient's LAB data         Assessment:     1. Bradycardia, sinus    2. Essential hypertension    3. Incomplete right bundle branch block    4. Mixed hyperlipidemia    5. Mild mitral and tricuspid regurgitation with mild pulmonary hypertension.    6. Paroxysmal SVT (supraventricular tachycardia)          Plan:     Sarabjit is 62 years old white male who is here for cardiology follow-up.  Holter monitor results were discussed.  Patient has paroxysmal supraventricular tachycardia but also has tachybradycardia syndrome minimum heart rate of 33 bpm.  Patient has had no syncope or presyncope.  He is asymptomatic options such as flecainide therapy was discussed.  Patient could not tolerate beta-blocker therapy.  Hypertension is very well controlled patient is not sure what he is taking he says that he possibly has stopped lisinopril HCT and is taking only Norvasc.    Hyperlipidemia  He is taking Lipitor 40 mg " daily.  We will get a copy of lab work.    Mild mitral and tricuspid regurgitation with mild pulmonary hypertension asymptomatic.  LV ejection fraction is normal.  Clinically no evidence of heart failure.  Follow-up scheduled        No follow-ups on file.

## 2023-10-14 PROBLEM — I47.10 PAROXYSMAL SVT (SUPRAVENTRICULAR TACHYCARDIA): Status: ACTIVE | Noted: 2023-10-14
